# Patient Record
Sex: FEMALE | Race: BLACK OR AFRICAN AMERICAN | NOT HISPANIC OR LATINO | URBAN - METROPOLITAN AREA
[De-identification: names, ages, dates, MRNs, and addresses within clinical notes are randomized per-mention and may not be internally consistent; named-entity substitution may affect disease eponyms.]

---

## 2019-03-11 ENCOUNTER — OFFICE VISIT (OUTPATIENT)
Dept: SURGERY | Facility: CLINIC | Age: 29
End: 2019-03-11
Payer: MEDICAID

## 2019-03-11 VITALS
HEIGHT: 66 IN | WEIGHT: 208.19 LBS | BODY MASS INDEX: 33.46 KG/M2 | SYSTOLIC BLOOD PRESSURE: 135 MMHG | DIASTOLIC BLOOD PRESSURE: 88 MMHG

## 2019-03-11 DIAGNOSIS — K43.9 VENTRAL HERNIA WITHOUT OBSTRUCTION OR GANGRENE: Primary | ICD-10-CM

## 2019-03-11 PROCEDURE — 99203 PR OFFICE/OUTPT VISIT, NEW, LEVL III, 30-44 MIN: ICD-10-PCS | Mod: S$GLB,,, | Performed by: SURGERY

## 2019-03-11 PROCEDURE — 99203 OFFICE O/P NEW LOW 30 MIN: CPT | Mod: S$GLB,,, | Performed by: SURGERY

## 2019-03-11 NOTE — PROGRESS NOTES
History & Physical    SUBJECTIVE:     History of Present Illness:    28-year-old female referred by Dr. Lina Medrano for ventral abdominal wall hernia. Patient reports hernia has been present for over 10 years and has slowly increased in size and is now causing discomfort in the mid abdominal region. Denies nausea vomiting or obstruction. No prior surgery at the site.    Chief Complaint   Patient presents with    Hernia         Review of patient's allergies indicates:  Review of patient's allergies indicates:  No Known Allergies    No current outpatient medications on file prior to visit.     No current facility-administered medications on file prior to visit.        Past Medical History:   Diagnosis Date    Depression      Past Surgical History:   Procedure Laterality Date     SECTION       Family History   Problem Relation Age of Onset    Cancer Mother     Hypertension Maternal Grandmother        Social History     Socioeconomic History    Marital status: Unknown     Spouse name: Not on file    Number of children: Not on file    Years of education: Not on file    Highest education level: Not on file   Social Needs    Financial resource strain: Not on file    Food insecurity - worry: Not on file    Food insecurity - inability: Not on file    Transportation needs - medical: Not on file    Transportation needs - non-medical: Not on file   Occupational History    Not on file   Tobacco Use    Smoking status: Former Smoker   Substance and Sexual Activity    Alcohol use: Not on file    Drug use: Not on file    Sexual activity: Not on file   Other Topics Concern    Not on file   Social History Narrative    Not on file          Review of Systems   Constitutional: Negative for fever and weight loss.   HENT: Negative.    Respiratory: Negative for cough and shortness of breath.    Cardiovascular: Negative for chest pain, palpitations and leg swelling.   Gastrointestinal: Positive for  abdominal pain. Negative for heartburn, nausea and vomiting.   Genitourinary: Positive for dysuria and urgency.   Musculoskeletal: Negative for joint pain and neck pain.   Skin: Negative.    Neurological: Negative.    Endo/Heme/Allergies: Does not bruise/bleed easily.       OBJECTIVE:     Vitals:    03/11/19 1006   BP: 135/88                 Physical Exam:  Physical Exam   Constitutional: She is oriented to person, place, and time and well-developed, well-nourished, and in no distress.   HENT:   Head: Normocephalic and atraumatic.   Eyes: Pupils are equal, round, and reactive to light.   Neck: Normal range of motion. Neck supple. No thyromegaly present.   Cardiovascular: Normal rate, regular rhythm and normal heart sounds.   Pulmonary/Chest: Breath sounds normal. No respiratory distress.   Abdominal: Soft. Bowel sounds are normal. She exhibits no distension. There is no tenderness. A hernia is present. Hernia confirmed positive in the ventral area.       Genitourinary: Rectum normal.   Musculoskeletal: Normal range of motion.   Lymphadenopathy:     She has no cervical adenopathy.     She has no axillary adenopathy.   Neurological: She is alert and oriented to person, place, and time. Gait normal.   Skin: Skin is warm, dry and intact.   Psychiatric: Affect and judgment normal.           ASSESSMENT/PLAN:   Ventral abdominal wall hernia  PLAN:  Ventral hernia repair with mesh scheduled for March 21, 2019.  Risks and benefits as well as expected postoperative recovery and restrictions discussed with patient.

## 2019-03-21 ENCOUNTER — OUTSIDE PLACE OF SERVICE (OUTPATIENT)
Dept: ADMINISTRATIVE | Facility: OTHER | Age: 29
End: 2019-03-21
Payer: MEDICAID

## 2019-03-21 PROCEDURE — 49568 PR IMPLANT MESH HERNIA REPAIR/DEBRIDEMENT CLOSURE: CPT | Mod: ,,, | Performed by: SURGERY

## 2019-03-21 PROCEDURE — 49560 PR REPAIR INCISIONAL HERNIA,REDUCIBLE: ICD-10-PCS | Mod: ,,, | Performed by: SURGERY

## 2019-03-21 PROCEDURE — 49560 PR REPAIR INCISIONAL HERNIA,REDUCIBLE: CPT | Mod: ,,, | Performed by: SURGERY

## 2019-03-21 PROCEDURE — 49568 PR IMPLANT MESH HERNIA REPAIR/DEBRIDEMENT CLOSURE: ICD-10-PCS | Mod: ,,, | Performed by: SURGERY

## 2019-03-25 ENCOUNTER — OFFICE VISIT (OUTPATIENT)
Dept: SURGERY | Facility: CLINIC | Age: 29
End: 2019-03-25
Payer: MEDICAID

## 2019-03-25 DIAGNOSIS — Z98.890 POST-OPERATIVE STATE: Primary | ICD-10-CM

## 2019-03-25 PROCEDURE — 99024 PR POST-OP FOLLOW-UP VISIT: ICD-10-PCS | Mod: S$GLB,,, | Performed by: SURGERY

## 2019-03-25 PROCEDURE — 99024 POSTOP FOLLOW-UP VISIT: CPT | Mod: S$GLB,,, | Performed by: SURGERY

## 2019-03-25 NOTE — PROGRESS NOTES
HPI:  Postop ventral hernia repair with mesh.  Here for drain removal.  Output has decreased significantly since surgery and is serosanguineous    PHYSICAL EXAM:  Dressing is removed and CASPER drain removed. Incision is clean and dry. Abdominal binder reapplied  ASSESSMENT:    Stable postop ventral hernia repair, CASPER drain removed  PLAN:     Pre visit next Monday for suture removal. Advised against any heavy lifting or straining of the abdominal muscles.

## 2019-04-01 ENCOUNTER — OFFICE VISIT (OUTPATIENT)
Dept: SURGERY | Facility: CLINIC | Age: 29
End: 2019-04-01
Payer: MEDICAID

## 2019-04-01 DIAGNOSIS — Z98.890 POST-OPERATIVE STATE: Primary | ICD-10-CM

## 2019-04-01 PROCEDURE — 99024 PR POST-OP FOLLOW-UP VISIT: ICD-10-PCS | Mod: S$GLB,,, | Performed by: SURGERY

## 2019-04-01 PROCEDURE — 99024 POSTOP FOLLOW-UP VISIT: CPT | Mod: S$GLB,,, | Performed by: SURGERY

## 2019-04-01 NOTE — PROGRESS NOTES
HPI:  Status post ventral and incisional hernia repair.  Complains of pain when not wearing abdominal binder.  No fever or drainage noted.    PHYSICAL EXAM:  Suture removed from incision site.  Clean and dry with no drainage.  Steri-Strips remain intact.  Tenderness with deep palpation at hernia repair site.  ASSESSMENT:    Stable status post ventral hernia repair with mesh  PLAN:     No lifting greater than 20 lb for 6 weeks.  Revisit p.r.n.

## 2021-02-12 ENCOUNTER — TELEPHONE (OUTPATIENT)
Dept: OBSTETRICS AND GYNECOLOGY | Facility: CLINIC | Age: 31
End: 2021-02-12

## 2021-05-12 ENCOUNTER — PATIENT MESSAGE (OUTPATIENT)
Dept: RESEARCH | Facility: HOSPITAL | Age: 31
End: 2021-05-12

## 2024-01-02 ENCOUNTER — HOSPITAL ENCOUNTER (EMERGENCY)
Facility: HOSPITAL | Age: 34
Discharge: PSYCHIATRIC OR SUBSTANCE USE FACILITY | End: 2024-01-02
Attending: EMERGENCY MEDICINE
Payer: MEDICAID

## 2024-01-02 VITALS
SYSTOLIC BLOOD PRESSURE: 146 MMHG | BODY MASS INDEX: 32.58 KG/M2 | HEIGHT: 68 IN | HEART RATE: 70 BPM | DIASTOLIC BLOOD PRESSURE: 90 MMHG | TEMPERATURE: 99 F | OXYGEN SATURATION: 100 % | RESPIRATION RATE: 18 BRPM | WEIGHT: 215 LBS

## 2024-01-02 DIAGNOSIS — R45.851 SUICIDAL IDEATION: ICD-10-CM

## 2024-01-02 DIAGNOSIS — M79.602 PAIN OF LEFT UPPER EXTREMITY: Primary | ICD-10-CM

## 2024-01-02 DIAGNOSIS — R06.02 SOB (SHORTNESS OF BREATH): ICD-10-CM

## 2024-01-02 LAB
ALBUMIN SERPL-MCNC: 3.5 G/DL (ref 3.5–5)
ALBUMIN/GLOB SERPL: 0.9 RATIO (ref 1.1–2)
ALP SERPL-CCNC: 101 UNIT/L (ref 40–150)
ALT SERPL-CCNC: 11 UNIT/L (ref 0–55)
AMPHET UR QL SCN: POSITIVE
APAP SERPL-MCNC: <17.4 UG/ML (ref 17.4–30)
APPEARANCE UR: CLEAR
AST SERPL-CCNC: 14 UNIT/L (ref 5–34)
B-HCG SERPL QL: NEGATIVE
BACTERIA #/AREA URNS AUTO: ABNORMAL /HPF
BARBITURATE SCN PRESENT UR: NEGATIVE
BASOPHILS # BLD AUTO: 0.07 X10(3)/MCL
BASOPHILS NFR BLD AUTO: 0.7 %
BENZODIAZ UR QL SCN: NEGATIVE
BILIRUB SERPL-MCNC: 0.2 MG/DL
BILIRUB UR QL STRIP.AUTO: NEGATIVE
BUN SERPL-MCNC: 10.1 MG/DL (ref 7–18.7)
CALCIUM SERPL-MCNC: 8.8 MG/DL (ref 8.4–10.2)
CANNABINOIDS UR QL SCN: NEGATIVE
CHLORIDE SERPL-SCNC: 109 MMOL/L (ref 98–107)
CO2 SERPL-SCNC: 22 MMOL/L (ref 22–29)
COCAINE UR QL SCN: NEGATIVE
COLOR UR AUTO: ABNORMAL
CREAT SERPL-MCNC: 0.76 MG/DL (ref 0.55–1.02)
EOSINOPHIL # BLD AUTO: 0.42 X10(3)/MCL (ref 0–0.9)
EOSINOPHIL NFR BLD AUTO: 4.1 %
ERYTHROCYTE [DISTWIDTH] IN BLOOD BY AUTOMATED COUNT: 16.6 % (ref 11.5–17)
ETHANOL SERPL-MCNC: <10 MG/DL
FENTANYL UR QL SCN: NEGATIVE
GFR SERPLBLD CREATININE-BSD FMLA CKD-EPI: >60 MLS/MIN/1.73/M2
GLOBULIN SER-MCNC: 3.7 GM/DL (ref 2.4–3.5)
GLUCOSE SERPL-MCNC: 97 MG/DL (ref 74–100)
GLUCOSE UR QL STRIP.AUTO: NORMAL
HCT VFR BLD AUTO: 34.2 % (ref 37–47)
HGB BLD-MCNC: 10.6 G/DL (ref 12–16)
IMM GRANULOCYTES # BLD AUTO: 0.03 X10(3)/MCL (ref 0–0.04)
IMM GRANULOCYTES NFR BLD AUTO: 0.3 %
KETONES UR QL STRIP.AUTO: NEGATIVE
LEUKOCYTE ESTERASE UR QL STRIP.AUTO: NEGATIVE
LYMPHOCYTES # BLD AUTO: 3.57 X10(3)/MCL (ref 0.6–4.6)
LYMPHOCYTES NFR BLD AUTO: 35 %
MCH RBC QN AUTO: 23.8 PG (ref 27–31)
MCHC RBC AUTO-ENTMCNC: 31 G/DL (ref 33–36)
MCV RBC AUTO: 76.9 FL (ref 80–94)
MDMA UR QL SCN: NEGATIVE
MONOCYTES # BLD AUTO: 0.56 X10(3)/MCL (ref 0.1–1.3)
MONOCYTES NFR BLD AUTO: 5.5 %
MUCOUS THREADS URNS QL MICRO: ABNORMAL /LPF
NEUTROPHILS # BLD AUTO: 5.55 X10(3)/MCL (ref 2.1–9.2)
NEUTROPHILS NFR BLD AUTO: 54.4 %
NITRITE UR QL STRIP.AUTO: NEGATIVE
NRBC BLD AUTO-RTO: 0 %
OPIATES UR QL SCN: NEGATIVE
PCP UR QL: NEGATIVE
PH UR STRIP.AUTO: 6 [PH]
PH UR: 6 [PH] (ref 3–11)
PLATELET # BLD AUTO: 354 X10(3)/MCL (ref 130–400)
PMV BLD AUTO: 9.6 FL (ref 7.4–10.4)
POTASSIUM SERPL-SCNC: 3.8 MMOL/L (ref 3.5–5.1)
PROT SERPL-MCNC: 7.2 GM/DL (ref 6.4–8.3)
PROT UR QL STRIP.AUTO: NEGATIVE
RBC # BLD AUTO: 4.45 X10(6)/MCL (ref 4.2–5.4)
RBC #/AREA URNS AUTO: ABNORMAL /HPF
RBC UR QL AUTO: NEGATIVE
SALICYLATES SERPL-MCNC: <5 MG/DL (ref 15–30)
SARS-COV-2 RDRP RESP QL NAA+PROBE: NEGATIVE
SODIUM SERPL-SCNC: 140 MMOL/L (ref 136–145)
SP GR UR STRIP.AUTO: 1.03 (ref 1–1.03)
SPECIFIC GRAVITY, URINE AUTO (.000) (OHS): 1.03 (ref 1–1.03)
SQUAMOUS #/AREA URNS LPF: ABNORMAL /HPF
TROPONIN I SERPL-MCNC: <0.01 NG/ML (ref 0–0.04)
TSH SERPL-ACNC: 1 UIU/ML (ref 0.35–4.94)
UROBILINOGEN UR STRIP-ACNC: NORMAL
WBC # SPEC AUTO: 10.2 X10(3)/MCL (ref 4.5–11.5)
WBC #/AREA URNS AUTO: ABNORMAL /HPF

## 2024-01-02 PROCEDURE — 84484 ASSAY OF TROPONIN QUANT: CPT | Performed by: PHYSICIAN ASSISTANT

## 2024-01-02 PROCEDURE — 80053 COMPREHEN METABOLIC PANEL: CPT | Performed by: PHYSICIAN ASSISTANT

## 2024-01-02 PROCEDURE — 81025 URINE PREGNANCY TEST: CPT | Performed by: EMERGENCY MEDICINE

## 2024-01-02 PROCEDURE — 99285 EMERGENCY DEPT VISIT HI MDM: CPT | Mod: 25

## 2024-01-02 PROCEDURE — 93005 ELECTROCARDIOGRAM TRACING: CPT

## 2024-01-02 PROCEDURE — 82077 ASSAY SPEC XCP UR&BREATH IA: CPT | Performed by: PHYSICIAN ASSISTANT

## 2024-01-02 PROCEDURE — 80143 DRUG ASSAY ACETAMINOPHEN: CPT | Performed by: PHYSICIAN ASSISTANT

## 2024-01-02 PROCEDURE — 84443 ASSAY THYROID STIM HORMONE: CPT | Performed by: PHYSICIAN ASSISTANT

## 2024-01-02 PROCEDURE — 80179 DRUG ASSAY SALICYLATE: CPT | Performed by: PHYSICIAN ASSISTANT

## 2024-01-02 PROCEDURE — 80307 DRUG TEST PRSMV CHEM ANLYZR: CPT | Performed by: PHYSICIAN ASSISTANT

## 2024-01-02 PROCEDURE — 87635 SARS-COV-2 COVID-19 AMP PRB: CPT | Performed by: EMERGENCY MEDICINE

## 2024-01-02 PROCEDURE — 81001 URINALYSIS AUTO W/SCOPE: CPT | Mod: XB | Performed by: PHYSICIAN ASSISTANT

## 2024-01-02 PROCEDURE — 93010 ELECTROCARDIOGRAM REPORT: CPT | Mod: ,,, | Performed by: INTERNAL MEDICINE

## 2024-01-02 PROCEDURE — 25000003 PHARM REV CODE 250: Performed by: EMERGENCY MEDICINE

## 2024-01-02 PROCEDURE — 85025 COMPLETE CBC W/AUTO DIFF WBC: CPT | Performed by: PHYSICIAN ASSISTANT

## 2024-01-02 RX ORDER — INDOMETHACIN 50 MG/1
50 CAPSULE ORAL
Status: COMPLETED | OUTPATIENT
Start: 2024-01-02 | End: 2024-01-02

## 2024-01-02 RX ADMIN — INDOMETHACIN 50 MG: 50 CAPSULE ORAL at 07:01

## 2024-01-02 NOTE — FIRST PROVIDER EVALUATION
"Medical screening examination initiated.  I have conducted a focused provider triage encounter, findings are as follows:    Brief history of present illness:  34 yo female presents to ED for evaluation of left arm pain for 2 days. Complains of pain radiating from shoulder to hand. States may have slept on arms wrong. Complains of SOB.     Vitals:    01/02/24 1710   BP: (!) 186/103   Pulse: 82   Resp: 20   Temp: 98.3 °F (36.8 °C)   TempSrc: Oral   SpO2: 98%   Weight: 97.5 kg (215 lb)   Height: 5' 8" (1.727 m)       Pertinent physical exam:  Patient is awake and alert and oriented.  Ambulatory to triage.  In no acute distress.      Brief workup plan:  labs, UA, UPT,    Preliminary workup initiated; this workup will be continued and followed by the physician or advanced practice provider that is assigned to the patient when roomed.  "

## 2024-01-02 NOTE — ED PROVIDER NOTES
Encounter Date: 2024    SCRIBE #1 NOTE: I, Fiordaliza Winters, am scribing for, and in the presence of,  Jorge Vazquez III, MD. I have scribed the following portions of the note - Other sections scribed: HPI, ROS, PE, MDM.       History     Chief Complaint   Patient presents with    Arm Pain     Left arm pain & weakness x 2 days, denies trauma. Denies chest pain, sob,.      33 year old female with a history of depression presents to ED complaining of left arm and shoulder pain and weakness that began 2 days ago.  Pt also says that she has been having ideas of suicide but does not have a plan.  She denies any hallucinations or previous admissions to mental health facilities.  She is also complaining of shortness of breath.   Spoke with aunt, in the hallway, she says that the patient has a history of schizophrenia and has been talking about hurting herself.    PEC initiated at 1800.    The history is provided by the patient. No  was used.     Review of patient's allergies indicates:  No Known Allergies  Past Medical History:   Diagnosis Date    Depression      Past Surgical History:   Procedure Laterality Date     SECTION       Family History   Problem Relation Age of Onset    Cancer Mother     Hypertension Maternal Grandmother      Social History     Tobacco Use    Smoking status: Former     Review of Systems   Constitutional:  Negative for fatigue, fever and unexpected weight change.   HENT:  Negative for congestion and rhinorrhea.    Eyes:  Negative for pain.   Respiratory:  Positive for shortness of breath. Negative for chest tightness and wheezing.    Cardiovascular:  Negative for chest pain.   Gastrointestinal:  Negative for abdominal pain, constipation, diarrhea, nausea and vomiting.   Genitourinary:  Negative for dysuria.   Musculoskeletal:  Negative for back pain and neck pain.        Left arm and shoulder pain   Skin:  Negative for rash.   Allergic/Immunologic: Negative for  environmental allergies, food allergies and immunocompromised state.   Neurological:  Negative for dizziness and speech difficulty.   Hematological:  Does not bruise/bleed easily.   Psychiatric/Behavioral:  Negative for sleep disturbance and suicidal ideas.        Physical Exam     Initial Vitals [01/02/24 1710]   BP Pulse Resp Temp SpO2   (!) 186/103 82 20 98.3 °F (36.8 °C) 98 %      MAP       --         Physical Exam    Nursing note and vitals reviewed.  Constitutional: No distress.   HENT:   Head: Normocephalic and atraumatic.   Neck: Trachea normal.   Cardiovascular:  Normal rate and regular rhythm.           No murmur heard.  Pulmonary/Chest: Breath sounds normal. No respiratory distress.   Abdominal: Abdomen is soft. Bowel sounds are normal. She exhibits no distension. There is no abdominal tenderness.   Musculoskeletal:         General: Normal range of motion.      Lumbar back: Normal range of motion.     Neurological: She is alert and oriented to person, place, and time. She has normal strength. No cranial nerve deficit.   Skin: Skin is warm and dry. No rash noted.   Psychiatric: Judgment normal. She exhibits a depressed mood.   Flat affect.  Expresses suicideal ideations, but no plan in place.         ED Course   Procedures  Labs Reviewed   COMPREHENSIVE METABOLIC PANEL - Abnormal; Notable for the following components:       Result Value    Chloride 109 (*)     Globulin 3.7 (*)     Albumin/Globulin Ratio 0.9 (*)     All other components within normal limits   URINALYSIS, REFLEX TO URINE CULTURE - Abnormal; Notable for the following components:    Mucous, UA Trace (*)     All other components within normal limits   DRUG SCREEN, URINE (BEAKER) - Abnormal; Notable for the following components:    Amphetamines, Urine Positive (*)     All other components within normal limits    Narrative:     Cut off concentrations:    Amphetamines - 1000 ng/ml  Barbiturates - 200 ng/ml  Benzodiazepine - 200 ng/ml  Cannabinoids  (THC) - 50 ng/ml  Cocaine - 300 ng/ml  Fentanyl - 1.0 ng/ml  MDMA - 500 ng/ml  Opiates - 300 ng/ml   Phencyclidine (PCP) - 25 ng/ml    Specimen submitted for drug analysis and tested for pH and specific gravity in order to evaluate sample integrity. Suspect tampering if specific gravity is <1.003 and/or pH is not within the range of 4.5 - 8.0  False negatives may result form substances such as bleach added to urine.  False positives may result for the presence of a substance with similar chemical structure to the drug or its metabolite.    This test provides only a PRELIMINARY analytical test result. A more specific alternate chemical method must be used in order to obtain a confirmed analytical result. Gas chromatography/mass spectrometry (GC/MS) is the preferred confirmatory method. Other chemical confirmation methods are available. Clinical consideration and professional judgement should be applied to any drug of abuse test result, particularly when preliminary positive results are used.    Positive results will be confirmed only at the physicians request. Unconfirmed screening results are to be used only for medical purposes (treatment).        ACETAMINOPHEN LEVEL - Abnormal; Notable for the following components:    Acetaminophen Level <17.4 (*)     All other components within normal limits   SALICYLATE LEVEL - Abnormal; Notable for the following components:    Salicylate Level <5.0 (*)     All other components within normal limits   CBC WITH DIFFERENTIAL - Abnormal; Notable for the following components:    Hgb 10.6 (*)     Hct 34.2 (*)     MCV 76.9 (*)     MCH 23.8 (*)     MCHC 31.0 (*)     All other components within normal limits   TSH - Normal   ALCOHOL,MEDICAL (ETHANOL) - Normal   TROPONIN I - Normal   PREGNANCY TEST, URINE RAPID - Normal   SARS-COV-2 RNA AMPLIFICATION, QUAL - Normal    Narrative:     The IDNOW COVID-19 assay is a rapid molecular in vitro diagnostic test utilizing an isothermal nucleic acid  amplification technology intended for the qualitative detection of nucleic acid from the SARS-CoV-2 viral RNA in direct nasal, nasopharyngeal or throat swabs from individuals who are suspected of COVID-19 by their healthcare provider.   CBC W/ AUTO DIFFERENTIAL    Narrative:     The following orders were created for panel order CBC auto differential.  Procedure                               Abnormality         Status                     ---------                               -----------         ------                     CBC with Differential[[id number]]       Abnormal            Final result                 Please view results for these tests on the individual orders.     EKG Readings: (Independently Interpreted)   Rhythm: Normal Sinus Rhythm. Heart Rate: 89. Ectopy: No Ectopy. Conduction: Normal. ST Segments: Normal ST Segments. T Waves: Normal. Clinical Impression: Normal Sinus Rhythm   Time: 1707       Imaging Results              X-Ray Chest AP Portable (Final result)  Result time 01/02/24 17:58:55      Final result by Shameka Marin MD (01/02/24 17:58:55)                   Impression:      No acute abnormality of the chest.      Electronically signed by: Shameka Marin  Date:    01/02/2024  Time:    17:58               Narrative:    EXAMINATION:  XR CHEST AP PORTABLE    CLINICAL HISTORY:  shortness of breath;    COMPARISON:  None    FINDINGS:  The heart is normal in size.  The lungs are clear.  No pleural effusion or visible pneumothorax.                                       Medications   indomethacin capsule 50 mg (has no administration in time range)     Medical Decision Making  Differential diagnosis includes, but is not limited to  depression anxiety arthralgia     Patient and showed up with suicidal text messages patient states that she is thought about wanting to hurt herself but would not do it.  But text messages are extremely concerning CBC chemistry normal will be given anti-inflammatory  for arm medically clear for psychiatric admission physician's Emergency certificate has been filled out    Problems Addressed:  Suicidal ideation: complicated acute illness or injury that poses a threat to life or bodily functions    Amount and/or Complexity of Data Reviewed  Independent Historian: caregiver     Details: Spoke with aunt, in the hallway, she says that the patient has a history of schizophrenia and has been talking about hurting herself.  Labs: ordered.  Radiology: ordered.            Scribe Attestation:   Scribe #1: I performed the above scribed service and the documentation accurately describes the services I performed. I attest to the accuracy of the note.    Attending Attestation:           Physician Attestation for Scribe:  Physician Attestation Statement for Scribe #1: I, Jorge Vazquez III, MD, reviewed documentation, as scribed by Fiordaliza Winters in my presence, and it is both accurate and complete.                Medically cleared for psychiatry placement: 1/2/2024  6:57 PM                   Clinical Impression:  Final diagnoses:  [R06.02] SOB (shortness of breath)  [M79.602] Pain of left upper extremity (Primary)  [R45.851] Suicidal ideation          ED Disposition Condition    Transfer to Psych Facility Stable          ED Prescriptions    None       Follow-up Information    None          Jorge Vazquez III, MD  01/02/24 [id number]

## 2024-01-03 ENCOUNTER — HOSPITAL ENCOUNTER (INPATIENT)
Facility: HOSPITAL | Age: 34
LOS: 6 days | Discharge: HOME | DRG: 885 | End: 2024-01-09
Attending: PSYCHIATRY & NEUROLOGY | Admitting: PSYCHIATRY & NEUROLOGY
Payer: MEDICAID

## 2024-01-03 DIAGNOSIS — R45.851 DEPRESSION WITH SUICIDAL IDEATION: ICD-10-CM

## 2024-01-03 DIAGNOSIS — F32.A DEPRESSION WITH SUICIDAL IDEATION: ICD-10-CM

## 2024-01-03 PROBLEM — F33.2 SEVERE EPISODE OF RECURRENT MAJOR DEPRESSIVE DISORDER, WITHOUT PSYCHOTIC FEATURES: Status: ACTIVE | Noted: 2024-01-03

## 2024-01-03 PROBLEM — F20.9 SCHIZOPHRENIA: Status: ACTIVE | Noted: 2024-01-03

## 2024-01-03 PROBLEM — F15.10 AMPHETAMINE ABUSE: Status: ACTIVE | Noted: 2024-01-03

## 2024-01-03 LAB
CHOLEST SERPL-MCNC: 129 MG/DL
CHOLEST/HDLC SERPL: 4 {RATIO} (ref 0–5)
GLUCOSE P FAST SERPL-MCNC: 98 MG/DL (ref 70–100)
HDLC SERPL-MCNC: 35 MG/DL (ref 35–60)
LDLC SERPL CALC-MCNC: 74 MG/DL (ref 50–140)
T PALLIDUM AB SER QL: NONREACTIVE
TRIGL SERPL-MCNC: 98 MG/DL (ref 37–140)
VLDLC SERPL CALC-MCNC: 20 MG/DL

## 2024-01-03 PROCEDURE — 82947 ASSAY GLUCOSE BLOOD QUANT: CPT | Performed by: PSYCHIATRY & NEUROLOGY

## 2024-01-03 PROCEDURE — 11400000 HC PSYCH PRIVATE ROOM

## 2024-01-03 PROCEDURE — 86780 TREPONEMA PALLIDUM: CPT | Performed by: PSYCHIATRY & NEUROLOGY

## 2024-01-03 PROCEDURE — 25000003 PHARM REV CODE 250: Performed by: PSYCHIATRY & NEUROLOGY

## 2024-01-03 PROCEDURE — 80061 LIPID PANEL: CPT | Performed by: PSYCHIATRY & NEUROLOGY

## 2024-01-03 RX ORDER — OLANZAPINE 10 MG/1
10 TABLET, ORALLY DISINTEGRATING ORAL 4 TIMES DAILY PRN
Status: DISCONTINUED | OUTPATIENT
Start: 2024-01-03 | End: 2024-01-09 | Stop reason: HOSPADM

## 2024-01-03 RX ORDER — DEXTROAMPHETAMINE SACCHARATE, AMPHETAMINE ASPARTATE MONOHYDRATE, DEXTROAMPHETAMINE SULFATE AND AMPHETAMINE SULFATE 6.25; 6.25; 6.25; 6.25 MG/1; MG/1; MG/1; MG/1
25 CAPSULE, EXTENDED RELEASE ORAL EVERY MORNING
Status: ON HOLD | COMMUNITY
End: 2024-01-08 | Stop reason: HOSPADM

## 2024-01-03 RX ORDER — ACETAMINOPHEN 325 MG/1
650 TABLET ORAL EVERY 6 HOURS PRN
Status: DISCONTINUED | OUTPATIENT
Start: 2024-01-03 | End: 2024-01-09 | Stop reason: HOSPADM

## 2024-01-03 RX ORDER — HYDROXYZINE PAMOATE 25 MG/1
50 CAPSULE ORAL EVERY 6 HOURS PRN
Status: DISCONTINUED | OUTPATIENT
Start: 2024-01-03 | End: 2024-01-09 | Stop reason: HOSPADM

## 2024-01-03 RX ORDER — FLUOXETINE HYDROCHLORIDE 20 MG/1
20 CAPSULE ORAL DAILY
Status: DISCONTINUED | OUTPATIENT
Start: 2024-01-03 | End: 2024-01-09 | Stop reason: HOSPADM

## 2024-01-03 RX ORDER — MAG HYDROX/ALUMINUM HYD/SIMETH 200-200-20
30 SUSPENSION, ORAL (FINAL DOSE FORM) ORAL EVERY 6 HOURS PRN
Status: DISCONTINUED | OUTPATIENT
Start: 2024-01-03 | End: 2024-01-09 | Stop reason: HOSPADM

## 2024-01-03 RX ORDER — MUPIROCIN 20 MG/G
OINTMENT TOPICAL 2 TIMES DAILY
Status: DISCONTINUED | OUTPATIENT
Start: 2024-01-03 | End: 2024-01-03

## 2024-01-03 RX ADMIN — FLUOXETINE 20 MG: 20 CAPSULE ORAL at 01:01

## 2024-01-03 RX ADMIN — HYDROXYZINE PAMOATE 50 MG: 25 CAPSULE ORAL at 08:01

## 2024-01-03 NOTE — SUBJECTIVE & OBJECTIVE
"Past Medical History:   Diagnosis Date    Depression        Past Surgical History:   Procedure Laterality Date     SECTION         Review of patient's allergies indicates:  No Known Allergies    Current Facility-Administered Medications on File Prior to Encounter   Medication    [COMPLETED] indomethacin capsule 50 mg     Current Outpatient Medications on File Prior to Encounter   Medication Sig    dextroamphetamine-amphetamine (ADDERALL XR) 25 MG 24 hr capsule Take 25 mg by mouth every morning. Stated she only takes sometimes. " Just when I need it"     Family History       Problem Relation (Age of Onset)    Cancer Mother    Hypertension Maternal Grandmother          Tobacco Use    Smoking status: Former    Smokeless tobacco: Not on file   Substance and Sexual Activity    Alcohol use: Not on file    Drug use: Not on file    Sexual activity: Not on file     Review of Systems   Constitutional:  Negative for fever.   HENT: Negative.     Eyes: Negative.    Respiratory: Negative.     Cardiovascular:  Negative for chest pain.   Gastrointestinal:  Negative for abdominal distention, abdominal pain, nausea and vomiting.   Genitourinary:  Negative for difficulty urinating, dysuria and frequency.   Musculoskeletal: Negative.    Skin:  Negative for color change, rash and wound.   Neurological: Negative.    Psychiatric/Behavioral:  Positive for behavioral problems and suicidal ideas.      Objective:     Vital Signs (Most Recent):  Temp: 97.8 °F (36.6 °C) (24 0500)  Pulse: 78 (24 0500)  Resp: 16 (24 0500)  BP: 129/80 (24 0500)  SpO2: 97 % (24 0500) Vital Signs (24h Range):  Temp:  [97.8 °F (36.6 °C)-98.6 °F (37 °C)] 97.8 °F (36.6 °C)  Pulse:  [70-86] 78  Resp:  [16-20] 16  SpO2:  [97 %-100 %] 97 %  BP: (129-186)/() 129/80     Weight: 104.2 kg (229 lb 12.8 oz)  Body mass index is 37.09 kg/m².     Physical Exam  Vitals and nursing note reviewed. Exam conducted with a chaperone present. "   Constitutional:       General: She is not in acute distress.     Appearance: Normal appearance.   HENT:      Head: Normocephalic and atraumatic.      Nose: Nose normal.      Mouth/Throat:      Mouth: Mucous membranes are moist.   Eyes:      Extraocular Movements: Extraocular movements intact and EOM normal.      Conjunctiva/sclera: Conjunctivae normal.      Pupils: Pupils are equal, round, and reactive to light.   Cardiovascular:      Rate and Rhythm: Normal rate and regular rhythm.      Heart sounds: Normal heart sounds. No murmur heard.     No gallop.   Pulmonary:      Effort: Pulmonary effort is normal.      Breath sounds: Normal breath sounds. No wheezing, rhonchi or rales.   Musculoskeletal:         General: No swelling or deformity. Normal range of motion.      Cervical back: Normal range of motion and neck supple.   Skin:     General: Skin is warm and dry.   Neurological:      General: No focal deficit present.      Mental Status: She is alert.      Gait: Gait normal.   Psychiatric:         Attention and Perception: Attention normal.         Mood and Affect: Mood normal. Mood is not depressed. Affect is not angry or tearful.         Speech: Speech normal.         Behavior: Behavior is cooperative.         CRANIAL NERVES     CN I  cranial nerve I not tested    CN II   Visual fields full to confrontation.     CN III, IV, VI   Pupils are equal, round, and reactive to light.  Extraocular motions are normal.   Right pupil: Accommodation: intact.   Left pupil: Accommodation: intact.   CN III: no CN III palsy  CN VI: no CN VI palsy    CN V   Facial sensation intact.   Right facial sensation deficit: none  Left facial sensation deficit: none    CN VII   Facial expression full, symmetric.   Right facial weakness: none  Left facial weakness: none    CN VIII   CN VIII normal.   Hearing: intact    CN IX, X   CN IX normal.   CN X normal.   Palate: symmetric    CN XI   CN XI normal.   Right sternocleidomastoid strength:  normal  Left sternocleidomastoid strength: normal  Right trapezius strength: normal  Left trapezius strength: normal    CN XII   CN XII normal.   Tongue: not atrophic  Fasciculations: absent  Tongue deviation: none         Significant Labs: All pertinent labs within the past 24 hours have been reviewed.  Recent Lab Results         01/03/24  0558   01/02/24  1816   01/02/24  1815   01/02/24  1757        Phencyclidine   Negative           Albumin/Globulin Ratio       0.9       Acetaminophen (Tylenol), Serum       <17.4       Albumin       3.5       Alcohol, Serum       <10.0  Comment: This assay is performed for medical purposes only.       ALP       101       ALT       11       Amphetamine Screen, Ur   Positive           Appearance, UA   Clear           AST       14       Bacteria, UA   None Seen           Barbiturate Screen, Ur   Negative           Baso #       0.07       Basophil %       0.7       Benzodiazepine Screen, Urine   Negative           BILIRUBIN TOTAL       0.2       Bilirubin, UA   Negative           BUN       10.1       Calcium       8.8       Cannabinoids, Urine   Negative           Chloride       109       Cholesterol Total 129             CO2       22       Cocaine (Metab.)   Negative           Color, UA   Light-Yellow           ID NOW COVID-19, (DEEPTHI)     Negative         Creatinine       0.76       eGFR       >60       Eos #       0.42       Eosinophil %       4.1       Fentanyl, Urine   Negative           Globulin, Total       3.7       Gluc Fast 98             Glucose       97       Glucose, UA   Normal           HDL 35             Hematocrit       34.2       Hemoglobin       10.6       Immature Grans (Abs)       0.03       Immature Granulocytes       0.3       Ketones, UA   Negative           LDL Cholesterol 74.00             Leukocytes, UA   Negative           Lymph #       3.57       LYMPH %       35.0       MCH       23.8       MCHC       31.0       MCV       76.9       MDMA, Urine    Negative           Mono #       0.56       Mono %       5.5       MPV       9.6       Mucous, UA   Trace           Neut #       5.55       Neut %       54.4       NITRITE UA   Negative           nRBC       0.0       Occult Blood UA   Negative           Opiate Scrn, Ur   Negative           pH, UA   6.0           pH, Urine   6.0           Platelet Count       354       Potassium       3.8       Preg Test, Ur   Negative           PROTEIN TOTAL       7.2       Protein, UA   Negative           RBC       4.45       RBC, UA   0-5           RDW       16.6       Salicylate Lvl       <5.0       Sodium       140       Specific Gravity,UA   1.027           Specific Gravity, Urine Auto   1.027           Squamous Epithelial Cells, UA   Trace           Total Cholesterol/HDL Ratio 4             Triglycerides 98             Troponin I       <0.010       TSH       1.001       Urobilinogen, UA   Normal           Very Low Density Lipoprotein 20             WBC, UA   None Seen           WBC       10.20               Significant Imaging: I have reviewed all pertinent imaging results/findings within the past 24 hours.

## 2024-01-03 NOTE — PROGRESS NOTES
Recreation Therapy Progress Note    Date: 1  3  2024     Time: 1400 hour group    Group Title: leisure skills    Mood: depressed  and anxious less in 2nd group .    Behavior: participated in group with peers and staff    Affect: anxious and depressed less than I'm morning group.    Speech: normal    Cognition: alert  in cognitive games with staff and peers. Pt focused was better in 2nd group . Pt was less distracted. Pt had less laughing to herself at less intervals in 2nd group .    Participation Level: 100% with prompts and motivation to do her best in group.     Intervention:cont rt services.           Recreation Therapist   Signature: Ariadna Kennedy MA CTRS

## 2024-01-03 NOTE — PROGRESS NOTES
Recreation Therapy Progress Note    Date: 1  3  2023     Time: 10:00 am group     Group Title: creative expression     Mood: depressed and anxious    Behavior: participated in group    Affect: depressed and anxious pt states. Pt seems to have internal stimuli in group at intervals .    Speech: pt quiet  but talks when prompts from staff and peers    Cognition: alert  in cognitive game with prompts at intervals to stay focus on task. Pt laughing to self at intervals.    Participation Level: 100% with prompts from staff.     Intervention:cont rt services.           Recreation Therapist   Signature: Ariadna Kennedy MA CTRS

## 2024-01-03 NOTE — PLAN OF CARE
Problem: Suicide Risk  Goal: Absence of Self-Harm  Outcome: Ongoing, Progressing     Problem: Suicide Risk  Goal: Absence of Self-Harm  Outcome: Ongoing, Progressing

## 2024-01-03 NOTE — NURSING
Daily Nursing Note:      Behavior:    Patient (Marina Jorgensen is a 33 y.o. female, : 1990, MRN: [id number]) demonstrating an affect that was congruent. Marina demonstrating mood that is normal. Marina had an appearance that was clean. Marina denies suicidal ideation. Marina denies suicide plan. Marina denies homicidal ideation. Marina denies hallucinations.    Marina's  oral temperature is 97.8 °F (36.6 °C). Her blood pressure is 129/80 and her pulse is 78. Her respiration is 16 and oxygen saturation is 97%.     Marina's last BM was noted on: __23_____      Intervention:    Encourage Marina to perform self-hygiene, grooming, and changing of clothing. Monitor Marina's behavior and program compliance. Monitor Marina for suicidal ideation, homicidal ideation, sleep disturbance, and hallucinations. Encourage Marina to eat all portions of meals and assess for meal preferences. Monitor Marina for intake and output to ensure hydration. Notify the Physician (MD) for any medication refusal and any change in patient condition.      Response:    Marina verbalizes understand of unit process and procedures. Marina reported compliant with medications.      Plan:     Continue to monitor per MD orders; maintain patient safety.

## 2024-01-03 NOTE — PATIENT CARE CONFERENCE
Spoke with Magda Bashir in person.  She states that she does not believe that patient was suicidal but that she is just overwhelmed with life and others taking advantage of her.  She also indicated that patient has a hard time saying no or setting boundaries with others.  She reports that she plans to help patient out with her current financial situation and will be there to support her on discharge.
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
## 2024-01-03 NOTE — NURSING
"Admission Note:    Marina Jorgensen is a 33 y.o. female, : 1990, MRN: [id number], admitted on (Not on file) to [facility] for Tiago Carbajal MD with a diagnosis of depression/SI. Patient admitted on a status of Physician Emergency Certificate (PEC). Marina reports no known food or drug allergies.    Patient demonstrated an affect that was flat. Patient demonstrated mood during assessment that was depressed and pleasant and appropriate. Patient had an appearance that was clean.  Patient endorses suicidal ideation. Patient denies suicide plan. Patient endorses hallucinations.    Awake, alert, oriented X4, pleasant and cooperative, flat affect, depressed mood, expresses suicidal ideations, without any plan. Speech is clear, soft,  initial complaint in the ER was left arm and shoulder  pain, unknown cause, denies any trauma,  states with this nurse that she's been having left side weakness for approximately 2 years, she says her PCP Dr. Gibson in [facility] (next appt. 1-10-24) is aware of her left side complaint.   Denies any hallucinations in the ER, but endorses +AH with this nurse, "Yes, but I think it's just my thoughts."  +SI, "I'm going through a lot. I have thoughts of just not being here.  Like giving up."  Patient reports she made a text message to her family.  Denies any past suicide attempts. Denies any past inpatient admissions.  Patient denies taking any medications at his time except Adderall prn.  UDS +Amphetamines.  Denies any HI.  Denies any legal issues.  Single with 4 children, the youngest 10 yo lives with her but presently with older siblings who lives with their father.  Denies alcohol or drug.  Education; GED, CDL license, drives big trucks currently.  Q 15 minute safety checks initiated, will continue to monitor.            Marina's  vitals were not taken for this visit.     Marina's last BM was noted on: _3-8-77______    Metal detector screening " performed via security personnel. The result of the scan was negative. Head-to-toe physical assessment completed with the following findings:  Nothing found upon body screen. A full skin assessment was performed. Marina's skin appeared _WNL with multiple tattoos right arm, lower back, left shoulder, left top wrist, bilateral breast, right leg.  Marina was oriented to unit, staff, peers, and room. Patient belongings/valuables stored in locked intake room cabinet and changes of clothing provided to patient. Marina was placed on Q 15 min observations.

## 2024-01-03 NOTE — CONSULTS
"[facility] Hospital Medicine  Consult Note    Patient Name: Marina Jorgensen  MRN: [id number]  Admission Date: 1/3/2024  Hospital Length of Stay: 0 days  Attending Physician: Tiago Carbajal MD   Primary Care Provider: Lina Dove MD           Patient information was obtained from ER records.     Consults  Subjective:     Principal Problem: <principal problem not specified>    Chief Complaint: No chief complaint on file.   SI    HPI: 34 yo CF with h/o depression and schizophrenia presented to [facility] ED 23 with SI. Pt was not cooperative for examination. She did not allow me to examine her.     Past Medical History:   Diagnosis Date    Depression        Past Surgical History:   Procedure Laterality Date     SECTION         Review of patient's allergies indicates:  No Known Allergies    Current Facility-Administered Medications on File Prior to Encounter   Medication    [COMPLETED] indomethacin capsule 50 mg     Current Outpatient Medications on File Prior to Encounter   Medication Sig    dextroamphetamine-amphetamine (ADDERALL XR) 25 MG 24 hr capsule Take 25 mg by mouth every morning. Stated she only takes sometimes. " Just when I need it"     Family History       Problem Relation (Age of Onset)    Cancer Mother    Hypertension Maternal Grandmother          Tobacco Use    Smoking status: Former    Smokeless tobacco: Not on file   Substance and Sexual Activity    Alcohol use: Not on file    Drug use: Not on file    Sexual activity: Not on file     Review of Systems   Unable to perform ROS: Psychiatric disorder     Objective:     Vital Signs (Most Recent):  Temp: 97.8 °F (36.6 °C) (24 0500)  Pulse: 78 (24 0500)  Resp: 16 (24 0500)  BP: 129/80 (24 0500)  SpO2: 97 % (24 0500) Vital Signs (24h Range):  Temp:  [97.8 °F (36.6 °C)-98.6 °F (37 °C)] 97.8 °F (36.6 °C)  Pulse:  [70-86] 78  Resp:  [16-20] 16  SpO2:  [97 %-100 %] 97 %  BP: " (129-186)/() 129/80     Weight: 104.2 kg (229 lb 12.8 oz)  Body mass index is 37.09 kg/m².     Physical Exam  Vitals and nursing note reviewed.   Constitutional:       Appearance: She is obese.   HENT:      Head: Normocephalic and atraumatic.      Nose: Nose normal.   Pulmonary:      Effort: Pulmonary effort is normal.   Musculoskeletal:         General: Normal range of motion.   Neurological:      Mental Status: She is alert.      Gait: Gait normal.   Psychiatric:         Mood and Affect: Mood is depressed. Affect is angry and tearful.         Speech: Speech normal.         Behavior: Behavior is uncooperative.     Could not perform cranial nerve exam due to pt did not allow me to examine her.     Significant Labs: All pertinent labs within the past 24 hours have been reviewed.  Recent Lab Results         01/03/24  0558   01/02/24  1816   01/02/24  1815   01/02/24  1757        Phencyclidine   Negative           Albumin/Globulin Ratio       0.9       Acetaminophen (Tylenol), Serum       <17.4       Albumin       3.5       Alcohol, Serum       <10.0  Comment: This assay is performed for medical purposes only.       ALP       101       ALT       11       Amphetamine Screen, Ur   Positive           Appearance, UA   Clear           AST       14       Bacteria, UA   None Seen           Barbiturate Screen, Ur   Negative           Baso #       0.07       Basophil %       0.7       Benzodiazepine Screen, Urine   Negative           BILIRUBIN TOTAL       0.2       Bilirubin, UA   Negative           BUN       10.1       Calcium       8.8       Cannabinoids, Urine   Negative           Chloride       109       Cholesterol Total 129             CO2       22       Cocaine (Metab.)   Negative           Color, UA   Light-Yellow           ID NOW COVID-19, (DEEPTHI)     Negative         Creatinine       0.76       eGFR       >60       Eos #       0.42       Eosinophil %       4.1       Fentanyl, Urine   Negative           Globulin,  Total       3.7       Gluc Fast 98             Glucose       97       Glucose, UA   Normal           HDL 35             Hematocrit       34.2       Hemoglobin       10.6       Immature Grans (Abs)       0.03       Immature Granulocytes       0.3       Ketones, UA   Negative           LDL Cholesterol 74.00             Leukocytes, UA   Negative           Lymph #       3.57       LYMPH %       35.0       MCH       23.8       MCHC       31.0       MCV       76.9       MDMA, Urine   Negative           Mono #       0.56       Mono %       5.5       MPV       9.6       Mucous, UA   Trace           Neut #       5.55       Neut %       54.4       NITRITE UA   Negative           nRBC       0.0       Occult Blood UA   Negative           Opiate Scrn, Ur   Negative           pH, UA   6.0           pH, Urine   6.0           Platelet Count       354       Potassium       3.8       Preg Test, Ur   Negative           PROTEIN TOTAL       7.2       Protein, UA   Negative           RBC       4.45       RBC, UA   0-5           RDW       16.6       Salicylate Lvl       <5.0       Sodium       140       Specific Gravity,UA   1.027           Specific Gravity, Urine Auto   1.027           Squamous Epithelial Cells, UA   Trace           Total Cholesterol/HDL Ratio 4             Triglycerides 98             Troponin I       <0.010       TSH       1.001       Urobilinogen, UA   Normal           Very Low Density Lipoprotein 20             WBC, UA   None Seen           WBC       10.20               Significant Imaging: I have reviewed all pertinent imaging results/findings within the past 24 hours.  Assessment/Plan:     Severe episode of recurrent major depressive disorder, without psychotic features  Mgt per psyc.  Rec cessation of illegal substances.   VS and labs reviewed and stable.          Amphetamine abuse  Rec cessation.      Suicidal ideation          VTE Risk Mitigation (From admission, onward)      None                Thank you for  your consult. I will sign off. Please contact us if you have any additional questions.    VIOLA CHANDLER DO  Department of Hospital Medicine   [facility]

## 2024-01-03 NOTE — ASSESSMENT & PLAN NOTE
Patient with evidence of positive urine toxicology screen for amphetamine class agents.  At this time have no clear indication that she has been habituated on substance other than prescription Adderall XR.  This will need to be explored hopefully will be able to obtain additional collateral information to offer clarity.

## 2024-01-03 NOTE — HPI
34 yo AAF with h/o depression and schizophrenia presented to [facility] ED 1/2/23 with SI. Pt was cooperative for examination. She denies any trouble tasting, smelling. Denies CP, SOB, abd pain.

## 2024-01-03 NOTE — ASSESSMENT & PLAN NOTE
Patient this time be evaluated for appropriate trials of medications.  At this time we will recommend use of SSRI as patient does have a DOTD  limits on what may be described.  We will recommend utilization trials of Prozac.

## 2024-01-03 NOTE — CONSULTS
"[facility] Hospital Medicine  Consult Note    Patient Name: Marina Jorgensen  MRN: [id number]  Admission Date: 1/3/2024  Hospital Length of Stay: 0 days  Attending Physician: Tiago Carbajal MD   Primary Care Provider: Lina Dove MD   Consults: Cori Eaton DO - Hospitalist          Patient information was obtained from ER records.     Consults  Subjective:     Principal Problem: <principal problem not specified>    Chief Complaint: No chief complaint on file.   SI    HPI: 34 yo AAF with h/o depression and schizophrenia presented to [facility] ED 23 with SI. Pt was cooperative for examination. She denies any trouble tasting, smelling. Denies CP, SOB, abd pain.    Past Medical History:   Diagnosis Date    Depression        Past Surgical History:   Procedure Laterality Date     SECTION         Review of patient's allergies indicates:  No Known Allergies    Current Facility-Administered Medications on File Prior to Encounter   Medication    [COMPLETED] indomethacin capsule 50 mg     Current Outpatient Medications on File Prior to Encounter   Medication Sig    dextroamphetamine-amphetamine (ADDERALL XR) 25 MG 24 hr capsule Take 25 mg by mouth every morning. Stated she only takes sometimes. " Just when I need it"     Family History       Problem Relation (Age of Onset)    Cancer Mother    Hypertension Maternal Grandmother          Tobacco Use    Smoking status: Former    Smokeless tobacco: Not on file   Substance and Sexual Activity    Alcohol use: Not on file    Drug use: Not on file    Sexual activity: Not on file     Review of Systems   Constitutional:  Negative for fever.   HENT: Negative.     Eyes: Negative.    Respiratory: Negative.     Cardiovascular:  Negative for chest pain.   Gastrointestinal:  Negative for abdominal distention, abdominal pain, nausea and vomiting.   Genitourinary:  Negative for difficulty urinating, dysuria and frequency.   Musculoskeletal: " Negative.    Skin:  Negative for color change, rash and wound.   Neurological: Negative.    Psychiatric/Behavioral:  Positive for behavioral problems and suicidal ideas.      Objective:     Vital Signs (Most Recent):  Temp: 97.8 °F (36.6 °C) (01/03/24 0500)  Pulse: 78 (01/03/24 0500)  Resp: 16 (01/03/24 0500)  BP: 129/80 (01/03/24 0500)  SpO2: 97 % (01/03/24 0500) Vital Signs (24h Range):  Temp:  [97.8 °F (36.6 °C)-98.6 °F (37 °C)] 97.8 °F (36.6 °C)  Pulse:  [70-86] 78  Resp:  [16-20] 16  SpO2:  [97 %-100 %] 97 %  BP: (129-186)/() 129/80     Weight: 104.2 kg (229 lb 12.8 oz)  Body mass index is 37.09 kg/m².     Physical Exam  Vitals and nursing note reviewed. Exam conducted with a chaperone present.   Constitutional:       General: She is not in acute distress.     Appearance: Normal appearance.   HENT:      Head: Normocephalic and atraumatic.      Nose: Nose normal.      Mouth/Throat:      Mouth: Mucous membranes are moist.   Eyes:      Extraocular Movements: Extraocular movements intact and EOM normal.      Conjunctiva/sclera: Conjunctivae normal.      Pupils: Pupils are equal, round, and reactive to light.   Cardiovascular:      Rate and Rhythm: Normal rate and regular rhythm.      Heart sounds: Normal heart sounds. No murmur heard.     No gallop.   Pulmonary:      Effort: Pulmonary effort is normal.      Breath sounds: Normal breath sounds. No wheezing, rhonchi or rales.   Musculoskeletal:         General: No swelling or deformity. Normal range of motion.      Cervical back: Normal range of motion and neck supple.   Skin:     General: Skin is warm and dry.   Neurological:      General: No focal deficit present.      Mental Status: She is alert.      Gait: Gait normal.   Psychiatric:         Attention and Perception: Attention normal.         Mood and Affect: Mood normal. Mood is not depressed. Affect is not angry or tearful.         Speech: Speech normal.         Behavior: Behavior is cooperative.          CRANIAL NERVES     CN I  cranial nerve I not tested    CN II   Visual fields full to confrontation.     CN III, IV, VI   Pupils are equal, round, and reactive to light.  Extraocular motions are normal.   Right pupil: Accommodation: intact.   Left pupil: Accommodation: intact.   CN III: no CN III palsy  CN VI: no CN VI palsy    CN V   Facial sensation intact.   Right facial sensation deficit: none  Left facial sensation deficit: none    CN VII   Facial expression full, symmetric.   Right facial weakness: none  Left facial weakness: none    CN VIII   CN VIII normal.   Hearing: intact    CN IX, X   CN IX normal.   CN X normal.   Palate: symmetric    CN XI   CN XI normal.   Right sternocleidomastoid strength: normal  Left sternocleidomastoid strength: normal  Right trapezius strength: normal  Left trapezius strength: normal    CN XII   CN XII normal.   Tongue: not atrophic  Fasciculations: absent  Tongue deviation: none         Significant Labs: All pertinent labs within the past 24 hours have been reviewed.  Recent Lab Results         01/03/24  0558   01/02/24  1816   01/02/24  1815   01/02/24  1757        Phencyclidine   Negative           Albumin/Globulin Ratio       0.9       Acetaminophen (Tylenol), Serum       <17.4       Albumin       3.5       Alcohol, Serum       <10.0  Comment: This assay is performed for medical purposes only.       ALP       101       ALT       11       Amphetamine Screen, Ur   Positive           Appearance, UA   Clear           AST       14       Bacteria, UA   None Seen           Barbiturate Screen, Ur   Negative           Baso #       0.07       Basophil %       0.7       Benzodiazepine Screen, Urine   Negative           BILIRUBIN TOTAL       0.2       Bilirubin, UA   Negative           BUN       10.1       Calcium       8.8       Cannabinoids, Urine   Negative           Chloride       109       Cholesterol Total 129             CO2       22       Cocaine (Metab.)   Negative            Color, UA   Light-Yellow           ID NOW COVID-19, (DEEPTHI)     Negative         Creatinine       0.76       eGFR       >60       Eos #       0.42       Eosinophil %       4.1       Fentanyl, Urine   Negative           Globulin, Total       3.7       Gluc Fast 98             Glucose       97       Glucose, UA   Normal           HDL 35             Hematocrit       34.2       Hemoglobin       10.6       Immature Grans (Abs)       0.03       Immature Granulocytes       0.3       Ketones, UA   Negative           LDL Cholesterol 74.00             Leukocytes, UA   Negative           Lymph #       3.57       LYMPH %       35.0       MCH       23.8       MCHC       31.0       MCV       76.9       MDMA, Urine   Negative           Mono #       0.56       Mono %       5.5       MPV       9.6       Mucous, UA   Trace           Neut #       5.55       Neut %       54.4       NITRITE UA   Negative           nRBC       0.0       Occult Blood UA   Negative           Opiate Scrn, Ur   Negative           pH, UA   6.0           pH, Urine   6.0           Platelet Count       354       Potassium       3.8       Preg Test, Ur   Negative           PROTEIN TOTAL       7.2       Protein, UA   Negative           RBC       4.45       RBC, UA   0-5           RDW       16.6       Salicylate Lvl       <5.0       Sodium       140       Specific Gravity,UA   1.027           Specific Gravity, Urine Auto   1.027           Squamous Epithelial Cells, UA   Trace           Total Cholesterol/HDL Ratio 4             Triglycerides 98             Troponin I       <0.010       TSH       1.001       Urobilinogen, UA   Normal           Very Low Density Lipoprotein 20             WBC, UA   None Seen           WBC       10.20               Significant Imaging: I have reviewed all pertinent imaging results/findings within the past 24 hours.  Assessment/Plan:     Severe episode of recurrent major depressive disorder, without psychotic features  Mgt per  psyc.  Rec cessation of illegal substances.   VS and labs reviewed and stable.          Amphetamine abuse  Rec cessation.      Suicidal ideation          VTE Risk Mitigation (From admission, onward)      None                Thank you for your consult. I will sign off. Please contact us if you have any additional questions.    VIOLA CHANDLER,   Department of Hospital Medicine   [facility]
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
## 2024-01-03 NOTE — PLAN OF CARE
Problem: Adult Inpatient Plan of Care  Goal: Plan of Care Review  Outcome: Ongoing, Progressing  Goal: Readiness for Transition of Care  Outcome: Ongoing, Progressing     Problem: Violence Risk or Actual  Goal: Anger and Impulse Control  Outcome: Ongoing, Progressing  Intervention: Minimize Safety Risk  Flowsheets (Taken 1/3/2024 1318)  Behavior Management: behavioral plan developed  Enhanced Safety Measures: room near unit station     Problem: Suicide Risk  Goal: Absence of Self-Harm  Outcome: Ongoing, Progressing  Intervention: Assess Risk to Self and Maintain Safety  Flowsheets (Taken 1/3/2024 1318)  Behavior Management: behavioral plan developed  Intervention: Promote Psychosocial Wellbeing  Flowsheets (Taken 1/3/2024 1318)  Supportive Measures:   active listening utilized   verbalization of feelings encouraged  Intervention: Establish Safety Plan and Continuity of Care  Flowsheets (Taken 1/3/2024 1318)  Safe Transition Promotion: personal safety plan developed    Care Plan updated and reviewed

## 2024-01-03 NOTE — ASSESSMENT & PLAN NOTE
Patient will undergo full risk assessments by this provider throughout the course of her stay.  Patient was educated regards to overall suicide prevention education.

## 2024-01-03 NOTE — PROGRESS NOTES
Recreation Therapy Assessment    1. MOOD: depressed and withdrawn    2. BEHAVIOR:cooperative , quiet    3. AFFECT:depressed and anxious pt states    4. COGNITION: alert but distracted thinking  pt states    5. MOTOR BEHAVIOR/SKILLS: ambulatory    6. SPEECH:normal. Pt talking with motivation from staff.     7. LEISURE FUNCTIONING: declined due to depression pt states and anxiety.    8. LEISURE NEEDS: to re gain positive leisure interests and participation in daily life.    9. PT EDUCATION LEVEL: GED and . Pt states    10. WHAT IS THE BEST THING THAT EVER HAPPENED TO YOU? My children     11. THINGS THAT FRUSTRATE AND ANGER ME ARE: being depressed ,anxious and people bothering me. I just want to be left alone pt states.    12. WHEN I GET ANGRY, I USUALLY: stay to myself.     13. MY RELATIONSHIP WITH MOST PEOPLE ARE: distant lately.I like to be alone when Im depressed pt states. But family will not leave  me alone. Pt states.     14. LEISURE INTERESTS/SKILLS: music, shopping ,visit a friend ,family time with my children ,movies,talk on the phone. But not lately.     15. LEISURE BARRIERS: feeling depressed, anxious  and isolated from others lately. Pt states.     16. ASSESSMENT SUMMARY: pt is a 33  year old black female. Pt lives alone with one of her children. 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 Pt states she has been depressed and anxious a lot .. Pt states she has had bad thoughts in her head of wanting to give up but no SI plan.  Pt states anxiety in daily life have been a lot.       17. TX PLAN FOR RECREATION THERAPY:   Pt will receive rt services 2 x a day and 5 x a week with sven PENAS . Pt will be assisted to complete 3 to 4 assignments on   Problem solving skills and emotional outlets to enhance coping skills. Pt will be assisted to complete 3 to 4 assignments on leisure skills, interests and participation to enhance leisure interests, social skills, self  worth ,emotional outlets  and quality of life. Pt will utilize art,music,cognitive games and exercise to achieve these goals.  Assist pt 1;1 as needed to achieve her goals.   18. SIGNATURE/CREDENTIALS:  Ariadna Kennedy MA CTRS                                                                    DATE: 1  3  2024                             TIME:8:11 am         RECREATION THERAPIST  DEJA

## 2024-01-04 PROCEDURE — 25000003 PHARM REV CODE 250: Performed by: PSYCHIATRY & NEUROLOGY

## 2024-01-04 PROCEDURE — 11400000 HC PSYCH PRIVATE ROOM

## 2024-01-04 RX ADMIN — ACETAMINOPHEN 650 MG: 325 TABLET, FILM COATED ORAL at 02:01

## 2024-01-04 RX ADMIN — HYDROXYZINE PAMOATE 50 MG: 25 CAPSULE ORAL at 08:01

## 2024-01-04 RX ADMIN — FLUOXETINE 20 MG: 20 CAPSULE ORAL at 08:01

## 2024-01-04 NOTE — PLAN OF CARE
Problem: Adult Inpatient Plan of Care  Goal: Plan of Care Review  Outcome: Ongoing, Progressing  Goal: Patient-Specific Goal (Individualized)  Outcome: Ongoing, Progressing  Goal: Absence of Hospital-Acquired Illness or Injury  Outcome: Ongoing, Progressing  Intervention: Identify and Manage Fall Risk  Flowsheets (Taken 1/3/2024 2335)  Safety Promotion/Fall Prevention:   medications reviewed   nonskid shoes/socks when out of bed  Intervention: Prevent Infection  Flowsheets (Taken 1/3/2024 2335)  Infection Prevention:   hand hygiene promoted   rest/sleep promoted  Goal: Optimal Comfort and Wellbeing  Outcome: Ongoing, Progressing  Intervention: Monitor Pain and Promote Comfort  Flowsheets (Taken 1/3/2024 2335)  Pain Management Interventions: medication offered  Intervention: Provide Person-Centered Care  Flowsheets (Taken 1/3/2024 2335)  Trust Relationship/Rapport:   care explained   choices provided   emotional support provided   empathic listening provided   questions answered   questions encouraged   reassurance provided   thoughts/feelings acknowledged  Goal: Readiness for Transition of Care  Outcome: Ongoing, Progressing  Intervention: Mutually Develop Transition Plan  Flowsheets (Taken 1/3/2024 2335)  Equipment Currently Used at Home: none     Problem: Violence Risk or Actual  Goal: Anger and Impulse Control  Outcome: Ongoing, Progressing  Intervention: Minimize Safety Risk  Flowsheets (Taken 1/3/2024 2335)  Behavior Management: impulse control promoted  Sensory Stimulation Regulation:   lighting decreased   quiet environment promoted  Intervention: Promote Self-Control  Flowsheets (Taken 1/3/2024 2335)  Supportive Measures:   active listening utilized   counseling provided   decision-making supported   goal-setting facilitated   positive reinforcement provided   problem-solving facilitated   relaxation techniques promoted   self-care encouraged   self-reflection promoted   self-responsibility promoted    verbalization of feelings encouraged     Problem: Suicide Risk  Goal: Absence of Self-Harm  Outcome: Ongoing, Progressing  Intervention: Assess Risk to Self and Maintain Safety  Flowsheets (Taken 1/3/2024 2335)  Behavior Management: impulse control promoted  Intervention: Promote Psychosocial Wellbeing  Flowsheets (Taken 1/3/2024 2335)  Sleep/Rest Enhancement:   regular sleep/rest pattern promoted   relaxation techniques promoted  Supportive Measures:   active listening utilized   counseling provided   decision-making supported   goal-setting facilitated   positive reinforcement provided   problem-solving facilitated   relaxation techniques promoted   self-care encouraged   self-reflection promoted   self-responsibility promoted   verbalization of feelings encouraged  Family/Support System Care: self-care encouraged

## 2024-01-04 NOTE — PROGRESS NOTES
Recreation Therapy Progress Note    Date: 1 4 2024     Time: 9;45 am session    Group Title: creative expression    Mood: less anxious    Behavior: cooperative    Affect: less anxious and more calm    Speech: pt interacting more.    Cognition: pt focused better and less distracted in thought process.     Participation Level: 100% in group . Pt art work showed a lot of feelings that she keeps inside. Anxiety , secrets ,and guilty feelings. Pt talked a lot in group of the things she hold inside.     Intervention:cont rt services          Recreation Therapist   Signature: Ariadna toscano MA CTRS

## 2024-01-04 NOTE — PLAN OF CARE
Problem: Adult Inpatient Plan of Care  Goal: Readiness for Transition of Care  Outcome: Ongoing, Progressing     Problem: Violence Risk or Actual  Goal: Anger and Impulse Control  Outcome: Ongoing, Progressing     Problem: Suicide Risk  Goal: Absence of Self-Harm  Outcome: Ongoing, Progressing    Care plan reviewed

## 2024-01-04 NOTE — PROGRESS NOTES
Recreation Therapy Progress Note    Date: 1 4 2024     Time: 1400 hour group    Group Title: leisure skills group    Mood: less anxious .less depressed and more calm    Behavior: alert  and focused better in group today the patient is less distracted in art and cognitive game,    Affect: hopeful  she states,pt participating in group     Speech: pt talking more in group    Cognition: alert more in group and less distracted thinking     Participation Level: 100%     Intervention:cont rt services.           Recreation Therapist   Signature: sven Kennedy MA CTRS

## 2024-01-04 NOTE — GROUP NOTE
Nursing Group      Group Focus: Symptoms Management      Number of patients in attendance: 2    Group Start Time: 0845  Group End Time:  0930  Groups Date: 1/4/2024  Group Topic:  Behavioral Health  Group Department: [facility]  Group Facilitators:  Lorenza Gutiérrez RN  _____________________________________________________________________    Patient Name: Marina Jorgensen  MRN: [id number]  Patient Class: IP- Psych   Admission Date\Time: 1/3/2024 12:30 AM  Hospital Length of Stay: 1  Primary Care Provider: Lina Dove MD     Referred by: Behavioral Medicine Unit Treatment Team     Target symptoms: Depression, Anxiety, and Poor Coping Skills     Patient's response to treatment: Active Listening and Self-disclosure     Progress toward goals: Progressing slowly     Interval History: Attended and participated with good response.     Diagnosis: MDD     Plan: Continue treatment on BMU

## 2024-01-04 NOTE — NURSING
"Marina is awake alert and oriented. She slept all night. Reports to "still feeling tired", but admits that she was not sleeping well prior to admission. She denies suicidal thoughts or hallucinations. No longer having racing thoughts, "my mind feels blank". Compliant with medications, no adverse effects observed or reported. Preoccupied with discharge. Q 15 min safety checks, will monitor mood and behavior and offer emotional support.  "

## 2024-01-04 NOTE — PROGRESS NOTES
Date of service:  January 4, 2024.    Hospital day number 1.      33-year-old  female who at this time was interviewed today.  She appears to be tolerating current integration to community without difficulties.    Patient this time reports feeling somewhat better.  She states she was able to sleep fairly well last night.    Patient has been recommended to be started on trials of Prozac 20 mg p.o. q.day. Patient did accept this recommendation.  In many ways patient appears to be showing a flight to wellness.  She continues to report a symptom complexes diminished drastically.    Some staff member raise questions with the patient was responding.  After careful assessment I am not certain I have seen the same symptoms at this time.  Patient does have very ruminative and worried styles of thought but does not appear to be having any thought blocking or active response to positive symptoms of psychosis.  Predominant symptoms appear to be anxious in sad.    On mental status examination:  33-year-old  female whose affect at this time was constricted.  His thought processes this time were essentially linear.  His thought content showed no evidence of any clear auditory or visual hallucinations being acknowledged.  Patient made no statements to harm self.  Patient made no statements to harm others this time.  She shows poor adaptive skills however to prevent self-injurious behavior.  She did not show any overt paranoia or delusions at this time.  Her insight and judgment deemed to be limited.  Orientation was intact.  Gait was steady.      Impression:  Suicide ideation   Major depressive disorder recurrent moderate to severe without psychotic features   Nonadherence with medications in the past     Estimated continued stay 48-96 hours    Indications:  Patient was not achieved adequate stabilization with current medication trials need ongoing reassessment ensure safety upon  discharge.    Recommendations:  1. Continue with trials of Prozac 20 mg p.o. q.day.    2. We will continue to evaluate overall concerns of potential psychotic component to presentation.    3.  will need to work to get additional collateral information.

## 2024-01-05 PROCEDURE — 25000003 PHARM REV CODE 250: Performed by: PSYCHIATRY & NEUROLOGY

## 2024-01-05 PROCEDURE — 11400000 HC PSYCH PRIVATE ROOM

## 2024-01-05 RX ADMIN — OLANZAPINE 10 MG: 10 TABLET, ORALLY DISINTEGRATING ORAL at 08:01

## 2024-01-05 RX ADMIN — HYDROXYZINE PAMOATE 50 MG: 25 CAPSULE ORAL at 08:01

## 2024-01-05 RX ADMIN — FLUOXETINE 20 MG: 20 CAPSULE ORAL at 08:01

## 2024-01-05 NOTE — GROUP NOTE
Group Psychotherapy       Group Focus: Stress Management and Promoting Healthy Lifestyles      Number of patients in attendance: 3    Group Start Time: 1945  Group End Time:  2030  Groups Date: 1/4/2024  Group Topic:  Behavioral Health  Group Department: [facility]  Group Facilitators:  Dvay Cotto RN  _____________________________________________________________________    Patient Name: Marina Jorgensen  MRN: [id number]  Patient Class: IP- Psych   Admission Date\Time: 1/3/2024 12:30 AM  Hospital Length of Stay: 1  Primary Care Provider: Lina Dove MD     Referred by: Behavioral Medicine Unit Treatment Team     Target symptoms: Mood Disorder     Patient's response to treatment: Active Listening     Progress toward goals: Progressing slowly     Interval History: Attended and participated in evening group     Diagnosis: Suicidal Ideations     Plan: Continue treatment on BMU

## 2024-01-05 NOTE — NURSING
"Daily Nursing Note:      Behavior:    Patient (Marina Jorgensen is a 33 y.o. female, : 1990, MRN: [id number]) demonstrating an affect that was irritable. Marina demonstrating mood that is depressed. Marina had an appearance that was clean. Marina denies suicidal ideation. Marina denies suicide plan. Marina denies homicidal ideation. Marina denies hallucinations.    Marina's  height is 5' 6" (1.676 m) and weight is 104.2 kg (229 lb 12.8 oz). Her temperature is 98.1 °F (36.7 °C). Her blood pressure is 121/64 and her pulse is 86. Her respiration is 16 and oxygen saturation is 96%.     Marina's last BM was noted on: 24.    Pt is interacting more with oseas today. Ate 100/75/100% of meals served today as well as 100% of PM snack.    Intervention:    Encourage Marina to perform self-hygiene, grooming, and changing of clothing. Monitor Marina's behavior and program compliance. Monitor Marina for suicidal ideation, homicidal ideation, sleep disturbance, and hallucinations. Encourage Marina to eat all portions of meals and assess for meal preferences. Monitor Marina for intake and output to ensure hydration. Notify the Physician (MD) for any medication refusal and any change in patient condition.      Response:    Marina verbalizes understand of unit process and procedures. Marina compliant with medications.      Plan:     Continue to monitor per MD orders; maintain patient safety.    Q15min safety checks. Suicide precautions.  "

## 2024-01-05 NOTE — PLAN OF CARE
Problem: Adult Inpatient Plan of Care  Goal: Plan of Care Review  Outcome: Ongoing, Progressing  Flowsheets (Taken 1/4/2024 2309)  Plan of Care Reviewed With: patient  Goal: Patient-Specific Goal (Individualized)  Outcome: Ongoing, Progressing  Flowsheets (Taken 1/4/2024 2309)  Anxieties, Fears or Concerns: none  Individualized Care Needs: none  Goal: Absence of Hospital-Acquired Illness or Injury  Outcome: Ongoing, Progressing  Intervention: Identify and Manage Fall Risk  Flowsheets (Taken 1/4/2024 2309)  Safety Promotion/Fall Prevention: nonskid shoes/socks when out of bed  Intervention: Prevent Infection  Flowsheets (Taken 1/4/2024 2309)  Infection Prevention:   hand hygiene promoted   rest/sleep promoted  Goal: Optimal Comfort and Wellbeing  Outcome: Ongoing, Progressing  Intervention: Provide Person-Centered Care  Flowsheets (Taken 1/4/2024 2309)  Trust Relationship/Rapport:   care explained   choices provided   emotional support provided   empathic listening provided   questions answered   questions encouraged   reassurance provided   thoughts/feelings acknowledged  Goal: Readiness for Transition of Care  Outcome: Ongoing, Progressing  Intervention: Mutually Develop Transition Plan  Flowsheets (Taken 1/4/2024 2309)  Equipment Currently Used at Home: none     Problem: Suicide Risk  Goal: Absence of Self-Harm  Outcome: Ongoing, Progressing  Intervention: Assess Risk to Self and Maintain Safety  Flowsheets (Taken 1/4/2024 2309)  Behavior Management:   behavioral plan reviewed   impulse control promoted  Intervention: Promote Psychosocial Wellbeing  Flowsheets (Taken 1/4/2024 2309)  Sleep/Rest Enhancement:   awakenings minimized   regular sleep/rest pattern promoted   noise level reduced   relaxation techniques promoted  Supportive Measures:   active listening utilized   counseling provided   goal-setting facilitated   decision-making supported   positive reinforcement provided   problem-solving facilitated    relaxation techniques promoted   self-care encouraged   self-reflection promoted   self-responsibility promoted   verbalization of feelings encouraged  Family/Support System Care: self-care encouraged

## 2024-01-05 NOTE — PLAN OF CARE
POC reviewed and updated today.  Patient denies current suicidal thoughts;  mood improved;  slept well last night.  Compliant and cooperative.  Interacts well and attends groups.  Q 15 min checks continue for safety.

## 2024-01-05 NOTE — GROUP NOTE
Group Psychotherapy       Group Focus: Communication Skills      Number of patients in attendance: 2    Group Start Time: 1715  Group End Time:  1800  Groups Date: 1/4/2024  Group Topic:  Behavioral Health  Group Department: [facility]  Group Facilitators:  Carol Ann Ellington LPN  _____________________________________________________________________    Patient Name: Marina Jorgensen  MRN: [id number]  Patient Class: IP- Psych   Admission Date\Time: 1/3/2024 12:30 AM  Hospital Length of Stay: 1  Primary Care Provider: Lina Dove MD     Referred by: Behavioral Medicine Unit Treatment Team     Target symptoms: Depression, Anxiety, Mood Disorder     Patient's response to treatment: Active listening, active self disclosure     Progress toward goals: Progressing adequately     Interval History: Participated with good interaction     Diagnosis: MDD       Plan: Continue treatment on BMU

## 2024-01-05 NOTE — GROUP NOTE
Education group      Group Focus: Communication Skills      Number of patients in attendance: 3    Group Start Time: 0830  Group End Time:  0915  Groups Date: 1/5/2024  Group Topic:  Behavioral Health  Group Department: [facility]  Group Facilitators:  Nel Alfaro LPN  _____________________________________________________________________    Patient Name: Marina Jorgensen  MRN: [id number]  Patient Class: IP- Psych   Admission Date\Time: 1/3/2024 12:30 AM  Hospital Length of Stay: 2  Primary Care Provider: Lina Dove MD     Referred by: Behavioral Medicine Unit Treatment Team     Target symptoms: Depression and Anxiety     Patient's response to treatment: Active Listening, Self-disclosure, and Frequent Questions     Progress toward goals: Progressing adequately     Interval History: Verbalized understanding. Good participation     Diagnosis: MD     Plan: Continue treatment on BMU

## 2024-01-05 NOTE — PROGRESS NOTES
"2024 9:05 AM   Name: Marina Jorgensen   : 1990   MRN: [id number]   [facility] Progress Note     SUBJECTIVE:   Pt seen and chart reviewed, received update from staff. Pt is new to me, received clinical update from staff and reviewed notes by Dr. Carbajal. Briefly, pt was admitted on PEC for SI and worsening depression. For interview, pt is fairly calm and cooperative. Her TP is linear and organized, affect is mildly anxious but has fair range and she smiles appropriately. She reports that her mood is much better compared to when she arrived - feels less anxious, more hopeful, sleeping and eating well now. States she finally feels "rested" after getting a few good nights of sleep. She denies any hopelessness or SI/plan/intent. No psychotic sx reported or observed. She is tolerating prozac fine, denies s/e, and believes it to be helpful so far. No acute complaints or concerns voiced today.     Pt seen via telemedicine with synchronous voice and video telecommunication.       Current Medications:   Scheduled Meds:    FLUoxetine  20 mg Oral Daily      PRN Meds: acetaminophen, aluminum-magnesium hydroxide-simethicone, hydrOXYzine pamoate, OLANZapine zydis     Allergies:   Review of patient's allergies indicates:  No Known Allergies     OBJECTIVE:   Vitals   Vitals:    24 0609   BP: 126/80   Pulse: 83   Resp: 16   Temp: 98 °F (36.7 °C)        Mental Status Exam:  Appearance: appropriate and dressed in hospital scrubs  Sensorium: alert  Orientation: oriented to person, place, and time  Behavior/Cooperation: calm and cooperative  Movements/Motor Activity: No tremor or involuntary movements observed. No psychomotor retardation or agitation  Speech: normal tone, normal rate, normal volume  Affect: anxious and fair range  Mood: anxious, depressed but improving  Thought Process: linear and organized  Associations: no loosening of associations  Thought Content: denies SI/HI/plan/intent and no paranoia or delusions " "volunteered  Thought Perceptions: denies AVH and no RIS observed during assessment  Attention/Concentration: Grossly intact  Memory: recent and remote grossly intact  Insight: fair  Judgment: fair    No results found for this or any previous visit (from the past 48 hour(s)).   No results found for: "PHENYTOIN", "PHENOBARB", "VALPROATE", "CBMZ"      ASSESSMENT/PLAN:   Diagnosis:  Active Hospital Problems    Diagnosis  POA    Suicidal ideation [R45.851]  Not Applicable    Severe episode of recurrent major depressive disorder, without psychotic features [F33.2]  Yes    Amphetamine abuse [F15.10]  Yes      Resolved Hospital Problems   No resolved problems to display.       Plan:  - Cont Prozac 20mg PO daily for depression - positive response thus far, well tolerated  - Cont Vistaril 50mg PO qhs PRN for insomnia  - CTM and provide support - pt would benefit from additional monitoring in controlled environment d/t relative severity of sx on arrival, but do anticipate discharge early next week if mood continues to improve.       Expected Disposition Plan: Home      Ralph Watkins MD  Psychiatry - [facility]  01/05/2024 9:13 AM    "

## 2024-01-05 NOTE — PROGRESS NOTES
Recreation Therapy Progress Note    Date: 1 5 2024     Time: 10:00 am group    Group Title: creative expression     Mood: hopeful  and less depressed and less anxious     Behavior: participating in group     Affect: more calm and less anxious.    Speech: pt talking more in group with peers and staff.     Cognition: pt more focused in group and less distracted thinking at less intervals.     Participation Level: 100% . Pt has completed rt assignments  to her best.     Intervention:cont rt services until dc.           Recreation Therapist   Signature: Ariadna Kennedy MA Ctrs

## 2024-01-05 NOTE — PROGRESS NOTES
Recreation Therapy Progress Note    Date: 1 5 2024     Time: 1400 hour group    Group Title: leisure skills    Mood: less depressed and less anxious    Behavior:participated in group    Affect: more calm and less depressed    Speech: pt talking more in group    Cognition: pt more alert and less distracted     Participation Level: 100% completed work in art and music class.     Intervention:cont  rt services.           Recreation Therapist   Signature: Ariadna PENAS

## 2024-01-06 PROCEDURE — 11400000 HC PSYCH PRIVATE ROOM

## 2024-01-06 PROCEDURE — 25000003 PHARM REV CODE 250: Performed by: PSYCHIATRY & NEUROLOGY

## 2024-01-06 RX ADMIN — FLUOXETINE 20 MG: 20 CAPSULE ORAL at 08:01

## 2024-01-06 RX ADMIN — HYDROXYZINE PAMOATE 50 MG: 25 CAPSULE ORAL at 08:01

## 2024-01-06 NOTE — GROUP NOTE
"Education Group      Group Focus: Stress Management      Number of patients in attendance: 2    Group Start Time: 1000  Group End Time:  1045  Groups Date: 1/6/2024  Group Topic:  Behavioral Health  Group Department: [facility]  Group Facilitators:  Nel Alfaro LPN  _____________________________________________________________________    Patient Name: Marina Jorgensen  MRN: [id number]  Patient Class: IP- Psych   Admission Date\Time: 1/3/2024 12:30 AM  Hospital Length of Stay: 3  Primary Care Provider: Lina Dove MD     Referred by: Behavioral Medicine Unit Treatment Team     Target symptoms: Depression     Patient's response to treatment: Active Listening, Self-disclosure, and Frequent Questions     Progress toward goals: Progressing well     Interval History: Verbalized  understanding. " I like to go for a ride and listen to music. Just get away from it all"      Diagnosis: MDD       Plan: Continue treatment on BMU    "

## 2024-01-06 NOTE — NURSING
"Daily Nursing Note:      Behavior:    Patient (Marina Jorgensen is a 33 y.o. female, : 1990, MRN: [id number]) demonstrating an affect that was congruent. Marina demonstrating mood that is pleasant and appropriate. Marina had an appearance that was clean. Marina denies suicidal ideation. Marina denies suicide plan. Marina denies homicidal ideation. Marina denies hallucinations.    Marina's  height is 5' 6" (1.676 m) and weight is 104.2 kg (229 lb 12.8 oz). Her temperature is 99 °F (37.2 °C). Her blood pressure is 137/76 and her pulse is 77. Her respiration is 18 and oxygen saturation is 97%.     Marina's last BM was noted on: 24.    Marina was cooperative and pleasant while playing cards with staff and other patients with a smile on her face. She reports eating and sleeping well with no complaints at this time.      Intervention:    Encourage Marina to perform self-hygiene, grooming, and changing of clothing. Monitor Marina's behavior and program compliance. Monitor Marina for suicidal ideation, homicidal ideation, sleep disturbance, and hallucinations. Encourage Marina to eat all portions of meals and assess for meal preferences. Monitor Marina for intake and output to ensure hydration. Notify the Physician (MD) for any medication refusal and any change in patient condition.      Response:    Marina verbalizes understand of unit process and procedures. Marina compliant with medications.      Plan:     Continue to monitor per MD orders; maintain patient safety.    Q15min safety checks in progress. Suicide precautions maintained. Will continue to monitor  "

## 2024-01-06 NOTE — NURSING
Awake and pacing noted appears anxious zyprexa 10mg po given, request for visiral 50mg given also will cont to monitor

## 2024-01-06 NOTE — PLAN OF CARE
Problem: Adult Inpatient Plan of Care  Goal: Plan of Care Review  Outcome: Ongoing, Progressing  Goal: Patient-Specific Goal (Individualized)  Outcome: Ongoing, Progressing  Goal: Readiness for Transition of Care  Outcome: Ongoing, Progressing     Problem: Suicide Risk  Goal: Absence of Self-Harm  Outcome: Ongoing, Progressing

## 2024-01-07 PROCEDURE — 25000003 PHARM REV CODE 250: Performed by: PSYCHIATRY & NEUROLOGY

## 2024-01-07 PROCEDURE — 11400000 HC PSYCH PRIVATE ROOM

## 2024-01-07 RX ADMIN — FLUOXETINE 20 MG: 20 CAPSULE ORAL at 08:01

## 2024-01-07 RX ADMIN — HYDROXYZINE PAMOATE 50 MG: 25 CAPSULE ORAL at 08:01

## 2024-01-07 NOTE — GROUP NOTE
Education Group      Group Focus: Life Skills      Number of patients in attendance: 3    Group Start Time: 1300  Group End Time:  1345  Groups Date: 1/7/2024  Group Topic:  Behavioral Health  Group Department: [facility]  Group Facilitators:  Nel Alfaro LPN  _____________________________________________________________________    Patient Name: Marina Jorgensen  MRN: [id number]  Patient Class: IP- Psych   Admission Date\Time: 1/3/2024 12:30 AM  Hospital Length of Stay: 4  Primary Care Provider: Lina Dove MD     Referred by: Behavioral Medicine Unit Treatment Team     Target symptoms: Depression     Patient's response to treatment: Active Listening and Self-disclosure     Progress toward goals: Progressing well     Interval History: Verbalized understanding. Good participation     Diagnosis: Depression     Plan: Continue treatment on BMU

## 2024-01-07 NOTE — NURSING
"Daily Nursing Note:      Behavior:    Patient (Marina Jorgensen is a 33 y.o. female, : 1990, MRN: [id number]) demonstrating an affect that was congruent. Marina demonstrating mood that is normal. Marina had an appearance that was clean. Marina denies suicidal ideation. Marina denies suicide plan. Marina denies homicidal ideation. Marina denies hallucinations.    Marina's  height is 5' 6" (1.676 m) and weight is 104.2 kg (229 lb 12.8 oz). Her oral temperature is 98.2 °F (36.8 °C). Her blood pressure is 121/86 and her pulse is 83. Her respiration is 18 and oxygen saturation is 97%.     Intervention:    Encourage Marina to perform self-hygiene, grooming, and changing of clothing. Monitor Marina's behavior and program compliance. Monitor Marina for suicidal ideation, homicidal ideation, sleep disturbance, and hallucinations. Encourage Marina to eat all portions of meals and assess for meal preferences. Monitor Marina for intake and output to ensure hydration. Notify the Physician/Physician Assistant/Advance Practice Registered Nurse (MD/PA/APRN) for any medication refusal and any change in patient condition.    Sitting in the day room, quiet, watching TV, smiling minimal interactions with peers and staff, stated she had a good day today, no acute needs this evening, ate snacks and compliant with medications,  Q 15 minute safety checks in progress, will continue to monitor.      Response:    Marina verbalizes understand of unit process and procedures. Marina is compliant with medications.      Plan:     Continue to monitor per MD/PA/APRN orders; maintain patient safety.  "

## 2024-01-08 PROCEDURE — 25000003 PHARM REV CODE 250: Performed by: PSYCHIATRY & NEUROLOGY

## 2024-01-08 PROCEDURE — 11400000 HC PSYCH PRIVATE ROOM

## 2024-01-08 RX ORDER — MIRTAZAPINE 15 MG/1
15 TABLET, FILM COATED ORAL NIGHTLY
Qty: 30 TABLET | Refills: 1 | Status: SHIPPED | OUTPATIENT
Start: 2024-01-08 | End: 2024-03-08

## 2024-01-08 RX ORDER — FLUOXETINE HYDROCHLORIDE 20 MG/1
20 CAPSULE ORAL DAILY
Qty: 30 CAPSULE | Refills: 1 | Status: SHIPPED | OUTPATIENT
Start: 2024-01-09 | End: 2024-03-09

## 2024-01-08 RX ORDER — MIRTAZAPINE 15 MG/1
15 TABLET, FILM COATED ORAL NIGHTLY
Status: DISCONTINUED | OUTPATIENT
Start: 2024-01-08 | End: 2024-01-09 | Stop reason: HOSPADM

## 2024-01-08 RX ADMIN — MIRTAZAPINE 15 MG: 15 TABLET, FILM COATED ORAL at 08:01

## 2024-01-08 RX ADMIN — FLUOXETINE 20 MG: 20 CAPSULE ORAL at 08:01

## 2024-01-08 NOTE — NURSING
"PRN Medication Follow-up Note:    Behavior:    Patient (Marina Jorgensen is a 33 y.o. female, : 1990, MRN: [id number])     Allergies: Patient has no known allergies.    Marina's  height is 5' 6" (1.676 m) and weight is 104.2 kg (229 lb 12.8 oz). Her temperature is 97.8 °F (36.6 °C). Her blood pressure is 126/84 and her pulse is 84. Her respiration is 16 and oxygen saturation is 99%.     Administered Vistaril 50mg PO per physician order to Marina       Response:    Marina's response: Patient in room at this time in bed with eyes closed. She appears to be sleeping. Respirations even and unlabored with no apparent distress noted.          Plan:     Continue to monitor per MD/PA/APRN orders; and reevaluate medication effectiveness within 30 minutes.  "

## 2024-01-08 NOTE — PROGRESS NOTES
Date of service:  01/08/2024.    Hospital day 5.     33-year-old  female who at this time is seen remotely.  Patient today is seen on telemedicine platform that his synchronized voice and video.  Patient was consented to this evaluation.  Patient is in [facility].  Physician is in [facility].    Patient at this time appears to be tolerating Prozac well.  She does report ongoing challenges at this time with her sleep cycle.  She reports she still is having difficulty getting asleep and staying asleep.  Patient was been consistently taking Vistaril at hours sleep.  We will need to look at the possibility of adding Remeron.    Patient was adamantly states today she was not suicidal.    On mental status examination:  33-year-old  female whose affect at this time is mildly constricted.  Whose speech is with good articulation and execution.  His thought processes this time were linear.  His thought content demonstrated no evidence of any auditory or visual hallucinations.  Patient was making no active statements to harm self.  Patient made no active statements to harm others.  She adamantly states that this time no thoughts to self injure.  She had not show delusions or distortions or perceptions.  Her insight and judgment this time were deemed to be improved.  Orientation was intact.      Impression:  Suicide ideations/resolved   Major depressive disorder recurrent moderate to severe without psychotic features    Indications: Patient this time appears to have reach full maximum hospital benefit social with augmentation asleep medicines can meet criteria for discharge in next 24 hours     Estimated continued stay:  24 hours     Recommendations:  1. We will begin trials of Remeron 15 mg p.o. q.h.s.  2. continue trials of Prozac 20 mg p.o. q.a.m.   3. Discharge patient to outpatient service level tomorrow.  Patient will need referral to mental health therapist versus referral  to [facility] for therapy.  Patient will be referred for follow-up with medication management on outpatient basis.

## 2024-01-08 NOTE — PROGRESS NOTES
Recreation Therapy Progress Note    Date: 1 8 2024     Time: 10;00 am group    Group Title: creative expression    Mood: hopeful and less depressed    Behavior: participating in group well    Affect:hopeful that she will do good when she goes home.     Speech: pt talking more in group     Cognition: pt more focused     Participation Level: 100%    Intervention:cont rt services until dc.          Recreation Therapist   Signature: Ariadna Kennedy MA CTRS

## 2024-01-08 NOTE — NURSING
"PRN Administration Note:    Behavior:    Patient (Marina Jorgensen is a 33 y.o. female, : 1990, MRN: [id number])     Allergies: Patient has no known allergies.    Marina's  height is 5' 6" (1.676 m) and weight is 104.2 kg (229 lb 12.8 oz). Her temperature is 97.8 °F (36.6 °C). Her blood pressure is 126/84 and her pulse is 84. Her respiration is 16 and oxygen saturation is 99%.     Reason for PRN Administration: Patient reported increased anxiety level and needed something to help her sleep.    Intervention:    Administered Vistaril 50mg PO per physician order to Marina       Response:    Marina tolerated administration well.      Plan:     Continue to monitor per MD/PA/APRN orders; and reevaluate medication effectiveness within 30 minutes.  "

## 2024-01-08 NOTE — PROGRESS NOTES
Recreation Therapy Progress Note    Date: 1 8 2024     Time: 12;00  pm noon group    Group Title: leisure skills    Mood: alert and focused a lot better.     Behavior: participating  well in group     Affect: hopeful and interacting well in group     Speech: pt talking more    Cognition: pt more attentive in group and less distracted thoughts at less intervals.     Participation Level: 100% in group . Pt completed 2 assignments     Intervention:cont rt services           Recreation Therapist   Signature: sven Kennedy MA CTRS

## 2024-01-08 NOTE — NURSING
Awake and alert, speech is clear, soft, calm and cooperative,  sitting in the day room, playing cards with self, smiles when addressed, quiet, denies any negative thoughts, medication compliant, ate 100% snack, sleeping well, Q 15 minute safety checks in progress, will continue monitor.

## 2024-01-08 NOTE — NURSING
Daily Nursing Note:    Patient (Marina Jorgensen is a 33 y.o. female, : 1990, MRN: [id number]) demonstrating an affect that was anxious. Marina demonstrating mood that is anxious and pleasant and appropriate. Marina had an appearance that was clean. Marina denies suicidal ideation. Marina denies suicide plan. Marina denies homicidal ideation. Marina denies hallucinations.      Encourage Marina to perform self-hygiene, grooming, and changing of clothing. Monitor Marina's behavior and program compliance. Monitor Marina for suicidal ideation, homicidal ideation, sleep disturbance, and hallucinations. Encourage Marina to eat all portions of meals and assess for meal preferences. Monitor Marina for intake and output to ensure hydration. Notify the Physician/Physician Assistant/[facility]  for any medication refusal and any change in patient condition.    Marina verbalizes understand of unit process and procedures. Marina is compliant with medications no adverse effects observed or reported. Plans are to discharge home in the morning and [facility].    Continue to monitor per MD/PA/APRN orders; maintain patient safety.

## 2024-01-09 VITALS
WEIGHT: 229.81 LBS | DIASTOLIC BLOOD PRESSURE: 79 MMHG | RESPIRATION RATE: 16 BRPM | SYSTOLIC BLOOD PRESSURE: 145 MMHG | HEIGHT: 66 IN | HEART RATE: 86 BPM | TEMPERATURE: 98 F | BODY MASS INDEX: 36.93 KG/M2 | OXYGEN SATURATION: 95 %

## 2024-01-09 PROCEDURE — 25000003 PHARM REV CODE 250: Performed by: PSYCHIATRY & NEUROLOGY

## 2024-01-09 RX ADMIN — FLUOXETINE 20 MG: 20 CAPSULE ORAL at 08:01

## 2024-01-09 NOTE — NURSING
Discharge Note:    Marina Jorgensen is a 33 y.o. female, : 1990, MRN: [id number], admitted on 1/3/2024 for Tiago Carbjaal MD with a diagnosis of Depression with suicidal ideation [F32.A, R45.851].    Patient discharged home via friend in stable condition. Patient denied suicidal ideation, homicidal ideation, or hallucinations.  Patient medication compliant with no c/o side effects.  Patient was discharged with valuables, personal belongings, prescriptions, discharge instructions, and an educational handout explaining the diagnosis and prescribed medications. Patient verbalized understanding of the discharge instructions and importance of follow-up visits.     Patient discharged on the following medications:     Medication List        START taking these medications      FLUoxetine 20 MG capsule  Take 1 capsule (20 mg total) by mouth once daily.     mirtazapine 15 MG tablet  Commonly known as: REMERON  Take 1 tablet (15 mg total) by mouth every evening.            STOP taking these medications      dextroamphetamine-amphetamine 25 MG 24 hr capsule  Commonly known as: ADDERALL XR               Where to Get Your Medications        These medications were sent to [id number] [place] [address] AT [facility] [address] [facility] [place] [address], [facility] [address]      Phone: [phone]   FLUoxetine 20 MG capsule  mirtazapine 15 MG tablet

## 2024-01-09 NOTE — PLAN OF CARE
Problem: Adult Inpatient Plan of Care  Goal: Plan of Care Review  Outcome: Met  Flowsheets (Taken 1/9/2024 0039)  Plan of Care Reviewed With: patient

## 2024-01-09 NOTE — NURSING
"Daily Nursing Note:      Behavior:    Patient (Marina Jorgensen is a 33 y.o. female, : 1990, MRN: [id number]) demonstrating an affect that was Incongruent with content of speech. Marina demonstrating mood that is pleasant and appropriate. Marina had an appearance that was clean. Marina denies suicidal ideation. Marina denies suicide plan. Marina denies homicidal ideation. Marina denies hallucinations.    Marina's  height is 5' 6" (1.676 m) and weight is 104.2 kg (229 lb 12.8 oz). Her temperature is 97.9 °F (36.6 °C). Her blood pressure is 127/82 and her pulse is 85. Her respiration is 16 and oxygen saturation is 97%.     Marina's last BM was noted on: 24    Marina ate 100/50/100% of her meals. She is interacting pleasantly with peers and staff. She expresses excitement about discharge tomorrow.       Intervention:    Encourage Marina to perform self-hygiene, grooming, and changing of clothing. Monitor Marina's behavior and program compliance. Monitor Marina for suicidal ideation, homicidal ideation, sleep disturbance, and hallucinations. Encourage Marina to eat all portions of meals and assess for meal preferences. Monitor Marina for intake and output to ensure hydration. Notify the Physician (MD) for any medication refusal and any change in patient condition.      Response:    Marina verbalizes understand of unit process and procedures. Marina is compliant with medications.      Plan:     Continue to monitor per MD orders; maintain patient safety.    Q15min safety checks in progress.  "

## 2024-01-09 NOTE — PLAN OF CARE
Problem: Adult Inpatient Plan of Care  Goal: Plan of Care Review  Outcome: Ongoing, Progressing     Problem: Suicide Risk  Goal: Absence of Self-Harm  Outcome: Met    Care plan reviewed and updated

## 2024-01-10 NOTE — HOSPITAL COURSE
Date of discharge:  January 9, 2024.    33-year-old  female who at this time presents ongoing challenges in terms of mood and mood instability.  Patient this time presents after she apparently had written in texting/social media that she wanted to be away from everyone in his was interpreted that she wanted to end her life by friends and family.  As a result she was subsequently taken area emergency room where she was placed on physician's emergency commitment.    Patient describes recent life stressor characterized by raising 4 children as a single mom and having increased difficulties in terms of financial difficulties.    Patient was admitted to the inpatient setting so she underwent full physical assessment.  On physical assessment patient was found to have no acute changes physical findings laboratories were essentially unremarkable.    Patient underwent full psychiatric assessment.  Patient showed evidence of predominant issues in terms of feeling anxious overwhelmed with despondent.  Recommendations were made institute patient was trials of Prozac for targeting symptoms of depression and sadness.  Patient tolerated initiation of these medications well.  Patient was maintained on trials of Prozac 20 mg p.o. q.day. patient had persistent ongoing challenges in terms of sleep and frequently was requiring melatonin.  Attempts to manage overall difficulties ultimately recommendations made to start patient on trials of Remeron 15 mg p.o. q.h.s..    Patient was verbalize at this time motivation to abstain illicit chemicals.  She was far more hopeful.  She was expressed at this time that she felt she could be more successful outpatient.  She was motivated for referral to outpatient services with potential Medicaid waiver program.  Certainly that level outpatient care at a high level of service would benefit her as well as her children moving forward.      Patient was made confined to inpatient setting  for 6 days.  After 6 days combined there was no doubt that she would maximized benefit and no longer posed imminent risk to self injure.  Patient was discharged to home in stable condition.
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
## 2024-03-01 DIAGNOSIS — M79.642 LEFT HAND PAIN: Primary | ICD-10-CM
